# Patient Record
Sex: MALE | Race: WHITE | URBAN - METROPOLITAN AREA
[De-identification: names, ages, dates, MRNs, and addresses within clinical notes are randomized per-mention and may not be internally consistent; named-entity substitution may affect disease eponyms.]

---

## 2022-06-25 ENCOUNTER — OFFICE VISIT (OUTPATIENT)
Dept: URBAN - METROPOLITAN AREA CLINIC 17 | Facility: CLINIC | Age: 72
End: 2022-06-25
Payer: MEDICARE

## 2022-06-25 DIAGNOSIS — H18.413 ARCUS SENILIS, BILATERAL: ICD-10-CM

## 2022-06-25 DIAGNOSIS — H25.13 AGE-RELATED NUCLEAR CATARACT, BILATERAL: Primary | ICD-10-CM

## 2022-06-25 DIAGNOSIS — H43.813 VITREOUS DEGENERATION, BILATERAL: ICD-10-CM

## 2022-06-25 DIAGNOSIS — H52.13 MYOPIA, BILATERAL: ICD-10-CM

## 2022-06-25 PROCEDURE — 99203 OFFICE O/P NEW LOW 30 MIN: CPT | Performed by: OPTOMETRIST

## 2022-06-25 ASSESSMENT — VISUAL ACUITY
OD: 20/20
OS: 20/20

## 2022-06-25 ASSESSMENT — INTRAOCULAR PRESSURE
OS: 9
OD: 8

## 2022-06-25 NOTE — IMPRESSION/PLAN
Impression: Myopia, bilateral: H52.13. Plan: Finalized [employer]. Patient education on appropriate options of eye glasses. Return to clinic in one year for complete eye exam and refraction.